# Patient Record
Sex: FEMALE | Race: BLACK OR AFRICAN AMERICAN | Employment: FULL TIME | URBAN - METROPOLITAN AREA
[De-identification: names, ages, dates, MRNs, and addresses within clinical notes are randomized per-mention and may not be internally consistent; named-entity substitution may affect disease eponyms.]

---

## 2019-03-27 ENCOUNTER — HOSPITAL ENCOUNTER (EMERGENCY)
Age: 54
Discharge: HOME | End: 2019-03-27
Attending: EMERGENCY MEDICINE
Payer: COMMERCIAL

## 2019-03-27 VITALS
HEIGHT: 68 IN | DIASTOLIC BLOOD PRESSURE: 72 MMHG | RESPIRATION RATE: 18 BRPM | SYSTOLIC BLOOD PRESSURE: 105 MMHG | WEIGHT: 137 LBS | BODY MASS INDEX: 20.76 KG/M2 | HEART RATE: 59 BPM | TEMPERATURE: 98.1 F | OXYGEN SATURATION: 100 %

## 2019-03-27 DIAGNOSIS — B02.9 HERPES ZOSTER WITHOUT COMPLICATION: Primary | ICD-10-CM

## 2019-03-27 PROCEDURE — 99282 EMERGENCY DEPT VISIT SF MDM: CPT

## 2019-03-27 RX ORDER — ACYCLOVIR 800 MG/1
800 TABLET ORAL 4 TIMES DAILY
Qty: 20 TAB | Refills: 0 | Status: SHIPPED | OUTPATIENT
Start: 2019-03-27 | End: 2019-04-01

## 2019-03-28 NOTE — ED TRIAGE NOTES
Allergic rx to some alcohol, juan jose noriega, pt with hives since Saturday. Denies any shortness of breath.

## 2025-07-07 ENCOUNTER — HOSPITAL ENCOUNTER (EMERGENCY)
Age: 60
Discharge: HOME | End: 2025-07-07
Attending: EMERGENCY MEDICINE
Payer: COMMERCIAL

## 2025-07-07 ENCOUNTER — APPOINTMENT (OUTPATIENT)
Age: 60
End: 2025-07-07
Payer: COMMERCIAL

## 2025-07-07 VITALS
OXYGEN SATURATION: 100 % | HEIGHT: 68 IN | SYSTOLIC BLOOD PRESSURE: 119 MMHG | BODY MASS INDEX: 20.76 KG/M2 | DIASTOLIC BLOOD PRESSURE: 86 MMHG | WEIGHT: 137 LBS | TEMPERATURE: 98.4 F | HEART RATE: 57 BPM | RESPIRATION RATE: 20 BRPM

## 2025-07-07 DIAGNOSIS — R20.2 PARESTHESIA: Primary | ICD-10-CM

## 2025-07-07 LAB
ALBUMIN SERPL-MCNC: 3.9 G/DL (ref 3.4–5)
ALBUMIN/GLOB SERPL: 1.2 (ref 1–1.9)
ALP SERPL-CCNC: 108 U/L (ref 45–117)
ALT SERPL-CCNC: 18 U/L (ref 10–35)
ANION GAP SERPL CALC-SCNC: 9 MMOL/L (ref 7–16)
AST SERPL-CCNC: 25 U/L (ref 10–38)
BASOPHILS # BLD: 0.04 K/UL (ref 0–0.1)
BASOPHILS NFR BLD: 1 % (ref 0–2)
BILIRUB SERPL-MCNC: 0.3 MG/DL (ref 0.2–1)
BUN SERPL-MCNC: 17 MG/DL (ref 6–23)
BUN/CREAT SERPL: 19
CALCIUM SERPL-MCNC: 10.6 MG/DL (ref 8.5–10.1)
CHLORIDE SERPL-SCNC: 102 MMOL/L (ref 98–107)
CO2 SERPL-SCNC: 30 MMOL/L (ref 21–32)
CREAT SERPL-MCNC: 0.88 MG/DL (ref 0.6–1.3)
DIFFERENTIAL METHOD BLD: ABNORMAL
EOSINOPHIL # BLD: 0.07 K/UL (ref 0–0.4)
EOSINOPHIL NFR BLD: 1.7 % (ref 0–5)
ERYTHROCYTE [DISTWIDTH] IN BLOOD BY AUTOMATED COUNT: 13.2 % (ref 11.6–14.5)
GLOBULIN SER CALC-MCNC: 3.3 G/DL (ref 2.3–3.5)
GLUCOSE BLD STRIP.AUTO-MCNC: 128 MG/DL (ref 70–110)
GLUCOSE SERPL-MCNC: 96 MG/DL (ref 74–108)
HCT VFR BLD AUTO: 37.7 % (ref 35–45)
HGB BLD-MCNC: 12.6 G/DL (ref 12–16)
IMM GRANULOCYTES # BLD AUTO: 0.01 K/UL (ref 0–0.04)
IMM GRANULOCYTES NFR BLD AUTO: 0.2 % (ref 0–0.5)
LYMPHOCYTES # BLD: 2.37 K/UL (ref 0.9–3.6)
LYMPHOCYTES NFR BLD: 58.7 % (ref 21–52)
MAGNESIUM SERPL-MCNC: 2.3 MG/DL (ref 1.6–2.6)
MCH RBC QN AUTO: 28.8 PG (ref 24–34)
MCHC RBC AUTO-ENTMCNC: 33.4 G/DL (ref 31–37)
MCV RBC AUTO: 86.1 FL (ref 78–100)
MONOCYTES # BLD: 0.48 K/UL (ref 0.05–1.2)
MONOCYTES NFR BLD: 11.9 % (ref 3–10)
NEUTS SEG # BLD: 1.07 K/UL (ref 1.8–8)
NEUTS SEG NFR BLD: 26.5 % (ref 40–73)
NRBC # BLD: 0 K/UL (ref 0–0.01)
NRBC BLD-RTO: 0 PER 100 WBC
PLATELET # BLD AUTO: 193 K/UL (ref 135–420)
PMV BLD AUTO: 13.5 FL (ref 9.2–11.8)
POTASSIUM SERPL-SCNC: 4 MMOL/L (ref 3.5–5.5)
PROT SERPL-MCNC: 7.2 G/DL (ref 6.4–8.2)
RBC # BLD AUTO: 4.38 M/UL (ref 4.2–5.3)
SODIUM SERPL-SCNC: 141 MMOL/L (ref 136–145)
WBC # BLD AUTO: 4 K/UL (ref 4.6–13.2)

## 2025-07-07 PROCEDURE — 82962 GLUCOSE BLOOD TEST: CPT

## 2025-07-07 PROCEDURE — 80053 COMPREHEN METABOLIC PANEL: CPT

## 2025-07-07 PROCEDURE — 85025 COMPLETE CBC W/AUTO DIFF WBC: CPT

## 2025-07-07 PROCEDURE — 99284 EMERGENCY DEPT VISIT MOD MDM: CPT

## 2025-07-07 PROCEDURE — 83735 ASSAY OF MAGNESIUM: CPT

## 2025-07-07 PROCEDURE — 70450 CT HEAD/BRAIN W/O DYE: CPT

## 2025-07-07 ASSESSMENT — PAIN - FUNCTIONAL ASSESSMENT: PAIN_FUNCTIONAL_ASSESSMENT: 0-10

## 2025-07-07 ASSESSMENT — LIFESTYLE VARIABLES
HOW OFTEN DO YOU HAVE A DRINK CONTAINING ALCOHOL: NEVER
HOW MANY STANDARD DRINKS CONTAINING ALCOHOL DO YOU HAVE ON A TYPICAL DAY: PATIENT DOES NOT DRINK

## 2025-07-07 ASSESSMENT — PAIN SCALES - GENERAL
PAINLEVEL_OUTOF10: 0
PAINLEVEL_OUTOF10: 0

## 2025-07-07 NOTE — ED TRIAGE NOTES
Left hand went numb yesterday. Couldn't type. Moved up to her arm. Started around 7p.  Then her left left leg started feeling numb from her knee down. Today her hand still feels numb. Leg and arm are better, but still numb. Denies previous episodes. Went to work today and had difficulty using her left hand

## 2025-07-07 NOTE — ED PROVIDER NOTES
• Drug use: Never       MEDICATIONS:  No current facility-administered medications for this encounter.     No current outpatient medications on file.       ALLERGIES:  No Known Allergies    SOCIAL DETERMINANTS OF HEALTH:  Social Drivers of Health     Tobacco Use: Low Risk  (7/7/2025)    Patient History    • Smoking Tobacco Use: Never    • Smokeless Tobacco Use: Never    • Passive Exposure: Never   Alcohol Use: Not At Risk (7/7/2025)    AUDIT-C    • Frequency of Alcohol Consumption: Never    • Average Number of Drinks: Patient does not drink    • Frequency of Binge Drinking: Never   Financial Resource Strain: Not on file   Food Insecurity: Not on file   Transportation Needs: Not on file   Physical Activity: Not on file   Stress: Not on file   Social Connections: Not on file   Intimate Partner Violence: Not on file   Depression: Not at risk (10/14/2022)    Received from [facility]    PHQ-2    • PHQ-9 Total Score: 0   Housing Stability: Not on file   Interpersonal Safety: Not on file   Utilities: Not on file       Review of Systems     Negative except as listed above in HPI.    Physical Exam     Vitals:    07/07/25 1754 07/07/25 1814 07/07/25 1834 07/07/25 1900   BP: 137/86 133/79 (!) 139/100    Pulse:    (!) 45   Resp:    18   Temp:    98.2 °F (36.8 °C)   TempSrc:    Oral   SpO2: 100% 100% 100%    Weight:       Height:           Constitutional: Well nourished, well developed, appears stated age. Alert and oriented.  HEENT: Normal cephalic/atraumatic.  neck supple without meningismus. PERRLA, no conjunctival injection. EOM intact, sclera anicteric.  Pharynx clear without exudates.  Cardiovascular: RRR, S1/S2, no murmurs, rubs, or gallops.  Warm, well-perfused extremities  Respiratory: Clear to auscultation bilaterally, no wheezing, rales, or rhonchi.  Unlabored respiratory effort  Abdominal: Soft, nontender, nondistended, no rebound, no guarding, no rigidity.  No CVA tenderness  Musculoskeletal: Full  122/88 to 139/100  - Patient will be discharged home with a diagnosis of paresthesias      ED Course:     The patient presented to the ED with complaints of numbness in her left hand that started the previous day and progressed to her arm and leg. She also reported feeling lightheaded over the past two weeks. The patient has a history of leukopenia, for which she sees an oncologist every six months. She denies any significant medical problems, smoking, or regular alcohol use, although she drinks occasionally. A CT scan of the brain was performed, revealing no acute intracranial process. Laboratory tests showed a WBC count of 4.0, hemoglobin of 12.6, and other values within normal limits. The patient was noted to have a slightly elevated blood pressure during her stay. She was discharged home with a diagnosis of paresthesias and advised to follow up with a primary care provider, as she currently does not have one.    Differential Diagnosis:    Differential diagnosis includes but is not limited to: transient ischemic attack (TIA), peripheral neuropathy, cervical radiculopathy, multiple sclerosis, vitamin deficiency (e.g., B12), and anxiety-related symptoms.    Diagnostics Review:    MY INDEPENDENT interpretation of the following tests(s) in the Emergency Department    Laboratory Interpretation:   WBC count 4.0, hemoglobin 12.6, hematocrit 37.7, platelet count 193, sodium 141, potassium 4.0, chloride 102, CO2 30, BUN 17, creatinine 0.88, calcium 10.6, AST 25, ALT 18, total bilirubin 0.3, magnesium 2.3, total protein 7.2, alkaline phosphatase 108.    Advanced Imaging Interpretation (Interpreted by me):   CT scan of the brain shows no acute intracranial process.    Patient History and Social Determinants:    Historians other than the patient: None    Care significantly affected by the following chronic Conditions: Leukopenia    Care significantly affected by the following Social Determinants of Health: Lack of a primary

## 2025-07-08 NOTE — DISCHARGE INSTRUCTIONS
Nick Mayes, It's Dr. Chan    It was a pleasure taking care of you in the emergency department today. I hope you are feeling better.    **Diagnosis: Paresthesias**    Paresthesias is a medical term for a tingling or numb feeling in your body, often described as \"pins and needles.\" You mentioned experiencing numbness that started in your hand and moved to your arm and leg. While it can be concerning, it's usually temporary and harmless. The good news is that your CT scan of the brain did not show any signs of a stroke, and that's very reassuring.    Your blood tests showed a normal white blood cell count, which helps your body fight infections, but you have a condition called leukopenia, meaning you have fewer white blood cells than usual. This has been monitored regularly by your specialist. Your other lab values were mostly normal.    Your blood pressure was slightly high during your visit, which can sometimes happen in a stressful situation like a hospital visit. It's important to keep an eye on it going forward.    There were no prescriptions needed today, but it's important to continue following up on your health, especially with a primary care provider. Finding and visiting a regular doctor will help manage any ongoing issues, including checking your blood pressure and conditions like paresthesias.    Please be sure to follow up with your primary care provider. Call to make an appointment as soon as possible and let them know you were seen in the emergency department.

## 2025-07-08 NOTE — ED NOTES
Patient placed back on cardiac telemetry monitor. Denies needs at this time.    Vitals reassessed  2100 patient is alert and oriented, steady gait, no pain. Discharge papers given with education r/t strokes, not to drive to call EMS right away without delay and educated on strokes.    When patient's pulse was low, she was asleep and then it came back up when she woke up